# Patient Record
Sex: MALE | Race: WHITE
[De-identification: names, ages, dates, MRNs, and addresses within clinical notes are randomized per-mention and may not be internally consistent; named-entity substitution may affect disease eponyms.]

---

## 2019-12-12 ENCOUNTER — HOSPITAL ENCOUNTER (EMERGENCY)
Dept: HOSPITAL 74 - JER | Age: 44
LOS: 1 days | Discharge: HOME | End: 2019-12-13
Payer: COMMERCIAL

## 2019-12-12 VITALS — DIASTOLIC BLOOD PRESSURE: 82 MMHG | TEMPERATURE: 97.8 F | SYSTOLIC BLOOD PRESSURE: 140 MMHG | HEART RATE: 85 BPM

## 2019-12-12 VITALS — BODY MASS INDEX: 32.1 KG/M2

## 2019-12-12 DIAGNOSIS — R31.9: Primary | ICD-10-CM

## 2019-12-12 LAB
ALBUMIN SERPL-MCNC: 4.2 G/DL (ref 3.4–5)
ALP SERPL-CCNC: 108 U/L (ref 45–117)
ALT SERPL-CCNC: 63 U/L (ref 13–61)
ANION GAP SERPL CALC-SCNC: 9 MMOL/L (ref 8–16)
APPEARANCE UR: CLEAR
AST SERPL-CCNC: 22 U/L (ref 15–37)
BACTERIA # UR AUTO: 1.2 /HPF
BASOPHILS # BLD: 0.7 % (ref 0–2)
BILIRUB SERPL-MCNC: 1 MG/DL (ref 0.2–1)
BILIRUB UR STRIP.AUTO-MCNC: NEGATIVE MG/DL
BUN SERPL-MCNC: 17.3 MG/DL (ref 7–18)
CALCIUM SERPL-MCNC: 8.9 MG/DL (ref 8.5–10.1)
CASTS URNS QL MICRO: 0 /LPF (ref 0–8)
CHLORIDE SERPL-SCNC: 102 MMOL/L (ref 98–107)
CO2 SERPL-SCNC: 26 MMOL/L (ref 21–32)
COLOR UR: (no result)
CREAT SERPL-MCNC: 1 MG/DL (ref 0.55–1.3)
DEPRECATED RDW RBC AUTO: 12.6 % (ref 11.9–15.9)
EOSINOPHIL # BLD: 0.9 % (ref 0–4.5)
EPITH CASTS URNS QL MICRO: 0.4 /HPF
GLUCOSE SERPL-MCNC: 379 MG/DL (ref 74–106)
HCT VFR BLD CALC: 44.4 % (ref 35.4–49)
HGB BLD-MCNC: 15.9 GM/DL (ref 11.7–16.9)
KETONES UR QL STRIP: NEGATIVE
LEUKOCYTE ESTERASE UR QL STRIP.AUTO: NEGATIVE
LYMPHOCYTES # BLD: 15.3 % (ref 8–40)
MCH RBC QN AUTO: 30.9 PG (ref 25.7–33.7)
MCHC RBC AUTO-ENTMCNC: 35.7 G/DL (ref 32–35.9)
MCV RBC: 86.5 FL (ref 80–96)
MONOCYTES # BLD AUTO: 5.2 % (ref 3.8–10.2)
NEUTROPHILS # BLD: 77.9 % (ref 42.8–82.8)
NITRITE UR QL STRIP: NEGATIVE
PH UR: 6 [PH] (ref 5–8)
PLATELET # BLD AUTO: 201 K/MM3 (ref 134–434)
PMV BLD: 10.3 FL (ref 7.5–11.1)
POTASSIUM SERPLBLD-SCNC: 4.1 MMOL/L (ref 3.5–5.1)
PROT SERPL-MCNC: 7.9 G/DL (ref 6.4–8.2)
PROT UR QL STRIP: (no result)
PROT UR QL STRIP: (no result)
RBC # BLD AUTO: 5.13 M/MM3 (ref 4–5.6)
RBC # BLD AUTO: 589 /HPF (ref 0–4)
SODIUM SERPL-SCNC: 138 MMOL/L (ref 136–145)
SP GR UR: 1.04 (ref 1.01–1.03)
UROBILINOGEN UR STRIP-MCNC: 1 MG/DL (ref 0.2–1)
WBC # BLD AUTO: 8.9 K/MM3 (ref 4–10)
WBC # UR AUTO: 1 /HPF (ref 0–5)

## 2019-12-12 PROCEDURE — 3E0337Z INTRODUCTION OF ELECTROLYTIC AND WATER BALANCE SUBSTANCE INTO PERIPHERAL VEIN, PERCUTANEOUS APPROACH: ICD-10-PCS

## 2019-12-12 NOTE — PDOC
History of Present Illness





- General


Chief Complaint: Hematuria


Stated Complaint: PAIN TO URINATE/PASSING BLOOD


Time Seen by Provider: 12/12/19 20:15


History Source: Patient


Exam Limitations: No Limitations





- History of Present Illness


Initial Comments: 





12/12/19 21:24


44-year-old [place]ian-speaking male denies past medical history presents 

complaining of suprapubic pressure, urinary frequency and hematuria since 10 AM 

this morning.  Since 1 PM this afternoon he has been passing clots from his 

urethra.  Denies fever, chills, nausea, vomiting, back pain, penile discharge, 

penile pain, testicular pain.  Denies having similar symptoms in the past.





ROS:


GENERAL/CONSTITUTIONAL: No fever, chills, weakness, dizziness


HEAD, EYES, EARS, NOSE AND THROAT: No changes in vision, No ear pain or 

discharge, No sore throat


CARDIOVASCULAR: No chest pain


RESPIRATORY: No shortness of breath or cough


GASTROINTESTINAL: No pain, nausea, vomiting, diarrhea or constipation


GENITOURINARY: Positive hematuria, urinary frequency, no dysuria


MUSCULOSKELETAL:  No neck or back pain


SKIN: No rash


NEUROLOGIC: No headache, vertigo, loss of consciousness, or loss of sensation








PE:


GENERAL: well-appearing, NAD


HEAD: NCAT


EYES: Pupils equal, round and reactive to light, sclera anicteric, conjunctiva 

clear


ENT: pharynx: no erythema, no exudate, uvula midline


NECK: supple


CHEST: nontender


RESP: clear, no w/r/r


CARDIO: rrr, no m/g/r


ABD: +BS, soft, nontender, non distended


BACK: no midline spinal ttp, no CVAT 


EXTREMITIES: Normal range of motion, no edema


NEUROLOGICAL: Normal speech, normal gait


SKIN: Warm, Dry





Past History





- Past Medical History


Allergies/Adverse Reactions: 


 Allergies











Allergy/AdvReac Type Severity Reaction Status Date / Time


 


No Known Allergies Allergy   Verified 12/12/19 19:32











COPD: No





- Psycho Social/Smoking Cessation Hx


Smoking History: Never smoked





*Physical Exam





- Vital Signs


 Last Vital Signs











Temp Pulse Resp BP Pulse Ox


 


 97.8 F   85   16   140/82   16 L


 


 12/12/19 19:31  12/12/19 19:31  12/12/19 19:31  12/12/19 19:31  12/12/19 19:31














ED Treatment Course





- LABORATORY


CBC & Chemistry Diagram: 


 12/12/19 20:30





 12/12/19 20:30





- ADDITIONAL ORDERS


Additional order review: 


 Laboratory  Results











  12/12/19 12/12/19





  20:30 20:30


 


Sodium   138


 


Potassium   4.1


 


Chloride   102


 


Carbon Dioxide   26


 


Anion Gap   9


 


BUN   17.3


 


Creatinine   1.0


 


Est GFR (CKD-EPI)AfAm   106.36


 


Est GFR (CKD-EPI)NonAf   91.77


 


Random Glucose   379 H


 


Calcium   8.9


 


Total Bilirubin   1.0


 


AST   22


 


ALT   63 H


 


Alkaline Phosphatase   108


 


Total Protein   7.9


 


Albumin   4.2


 


Urine Color  Orange 


 


Urine Appearance  Clear 


 


Urine pH  6.0 


 


Ur Specific Gravity  1.037 H 


 


Urine Protein  1+ H 


 


Urine Glucose (UA)  3+ H 


 


Urine Ketones  Negative 


 


Urine Blood  3+ H 


 


Urine Nitrite  Negative 


 


Urine Bilirubin  Negative 


 


Urine Urobilinogen  1.0 


 


Ur Leukocyte Esterase  Negative 


 


Urine WBC (Auto)  1 


 


Urine RBC (Auto)  589 


 


Urine Casts (Auto)  0 


 


U Epithel Cells (Auto)  0.4 


 


Urine Bacteria (Auto)  1.2 








 











  12/12/19





  20:30


 


RBC  5.13


 


MCV  86.5


 


MCHC  35.7


 


RDW  12.6


 


MPV  10.3


 


Neutrophils %  77.9


 


Lymphocytes %  15.3


 


Monocytes %  5.2


 


Eosinophils %  0.9


 


Basophils %  0.7














- Medications


Given in the ED: 


ED Medications














Discontinued Medications














Generic Name Dose Route Start Last Admin





  Trade Name Freq  PRN Reason Stop Dose Admin


 


Sodium Chloride  1,000 ml  12/12/19 20:26  12/12/19 20:48





  Normal Saline -  IV  12/12/19 20:27  1,000 ml





  ONCE ONE   Administration





     





     





     





     














Medical Decision Making





- Medical Decision Making





12/12/19 21:29


44-year-old male denies past medical history presents complaining of urinary 

frequency, suprapubic pressure and hematuria today.  Denies fever, chills, back 

pain, nausea, vomiting, testicular pain or penile pain.





Will send CBC, CMP, UA and urine culture


Urine GC chlamydia


IV fluids


Reassess





12/12/19 23:05


Glucose 350s


UA positive for blood, no WBCs, no nitrites


Patient passing small amount of clots after 1 L of IV fluids


Signed out to Dr. Cotto in the main ED for further evaluation





Discharge





- Discharge Information


Problems reviewed: Yes


Clinical Impression/Diagnosis: 


Hematuria


Qualifiers:


 Hematuria type: unspecified type Qualified Code(s): R31.9 - Hematuria, 

unspecified





Condition: Stable





- Follow up/Referral





- Patient Discharge Instructions





- Post Discharge Activity

## 2019-12-13 NOTE — PDOC
*Physical Exam





- Vital Signs


 Last Vital Signs











Temp Pulse Resp BP Pulse Ox


 


 97.8 F   85   16   140/82   16 L


 


 12/12/19 19:31  12/12/19 19:31  12/12/19 19:31  12/12/19 19:31  12/12/19 19:31














ED Treatment Course





- LABORATORY


CBC & Chemistry Diagram: 


 12/12/19 20:30





 12/12/19 20:30





- ADDITIONAL ORDERS


Additional order review: 


 Laboratory  Results











  12/12/19 12/12/19





  20:30 20:30


 


Sodium   138


 


Potassium   4.1


 


Chloride   102


 


Carbon Dioxide   26


 


Anion Gap   9


 


BUN   17.3


 


Creatinine   1.0


 


Est GFR (CKD-EPI)AfAm   106.36


 


Est GFR (CKD-EPI)NonAf   91.77


 


Random Glucose   379 H


 


Calcium   8.9


 


Total Bilirubin   1.0


 


AST   22


 


ALT   63 H


 


Alkaline Phosphatase   108


 


Total Protein   7.9


 


Albumin   4.2


 


Urine Color  Orange 


 


Urine Appearance  Clear 


 


Urine pH  6.0 


 


Ur Specific Gravity  1.037 H 


 


Urine Protein  1+ H 


 


Urine Glucose (UA)  3+ H 


 


Urine Ketones  Negative 


 


Urine Blood  3+ H 


 


Urine Nitrite  Negative 


 


Urine Bilirubin  Negative 


 


Urine Urobilinogen  1.0 


 


Ur Leukocyte Esterase  Negative 


 


Urine WBC (Auto)  1 


 


Urine RBC (Auto)  589 


 


Urine Casts (Auto)  0 


 


U Epithel Cells (Auto)  0.4 


 


Urine Bacteria (Auto)  1.2 








 











  12/12/19





  20:30


 


RBC  5.13


 


MCV  86.5


 


MCHC  35.7


 


RDW  12.6


 


MPV  10.3


 


Neutrophils %  77.9


 


Lymphocytes %  15.3


 


Monocytes %  5.2


 


Eosinophils %  0.9


 


Basophils %  0.7














- Medications


Given in the ED: 


ED Medications














Discontinued Medications














Generic Name Dose Route Start Last Admin





  Trade Name Freq  PRN Reason Stop Dose Admin


 


Sodium Chloride  1,000 ml  12/12/19 20:26  12/12/19 20:48





  Normal Saline -  IV  12/12/19 20:27  1,000 ml





  ONCE ONE   Administration





     





     





     





     














Medical Decision Making





- Medical Decision Making





12/13/19 00:57


Patient signed out as up triage from fast track.  Patient was being worked up 

for painless hematuria a/w frequency, urgency and suprapubic tenseness. No n/v, 

recent illness, rash. Afebrile





Urine sample visual exam with gross hematuria and clots, UA with +RBC, +protein

, negative for any casts


Isolated asymptomatic hyperglycemia, not ill appearing, UA pos for glu but neg 

for ketones, BMP only remarkable for glu of 379, no anemia








Patient moved to bed in main ED for CBI





Dispo per clinical course








CBI initiated, output initially of pink but clear urine, no clots


After about 30 minutes of irrigation, output cleared completely.  No signs of 

clots or hematuria





DC broussard


Urology follow up w/in 2 weeks


PCP follow up for glycemic control








Discharge





- Discharge Information


Problems reviewed: Yes


Clinical Impression/Diagnosis: 


Hematuria


Qualifiers:


 Hematuria type: unspecified type Qualified Code(s): R31.9 - Hematuria, 

unspecified





Condition: Improved


Disposition: HOME





- Admission


No





- Follow up/Referral


Referrals: 


Josue Colón MD [Staff Physician] - 


Ray Green MD [Staff Physician] - 


John Winkler MD., MD [Staff Physician] - 


Gabriel Judge MD [Staff Physician] - 


Khari Deleon MD [Staff Physician] - 





- Patient Discharge Instructions


Patient Printed Discharge Instructions:  DI for Hyperglycemia -- Adult, DI for 

Hematuria


Additional Instructions: 


You were seen here today for blood in your urine.  Initially there were visible 

clots but your urine cleared completely after irrigation.  Please make sure to 

follow up with one of the listed urologists within the next 2 weeks for a 

repeat evaluation of your urine and possible further evaluation.  If, before 

your follow up appointment, you note blood in your urine or become unable to 

pass urine please be re-evaluated emergently.





You were also found to have a high blood sugar.  Please make sure to follow up 

with your PCP to have your sugar monitored and managed.


Print Language: Kiswahili





- Post Discharge Activity

## 2022-12-21 ENCOUNTER — HOSPITAL ENCOUNTER (EMERGENCY)
Dept: HOSPITAL 74 - JER | Age: 47
LOS: 1 days | Discharge: HOME | End: 2022-12-22
Payer: COMMERCIAL

## 2022-12-21 VITALS — BODY MASS INDEX: 31.1 KG/M2

## 2022-12-21 VITALS
HEART RATE: 91 BPM | SYSTOLIC BLOOD PRESSURE: 141 MMHG | TEMPERATURE: 98.3 F | DIASTOLIC BLOOD PRESSURE: 90 MMHG | RESPIRATION RATE: 19 BRPM

## 2022-12-21 DIAGNOSIS — R10.12: Primary | ICD-10-CM

## 2022-12-21 PROCEDURE — 3E033GC INTRODUCTION OF OTHER THERAPEUTIC SUBSTANCE INTO PERIPHERAL VEIN, PERCUTANEOUS APPROACH: ICD-10-PCS

## 2022-12-22 LAB
ALBUMIN SERPL-MCNC: 4 G/DL (ref 3.4–5)
ALP SERPL-CCNC: 92 U/L (ref 45–117)
ALT SERPL-CCNC: 73 U/L (ref 13–61)
ANION GAP SERPL CALC-SCNC: 8 MMOL/L (ref 8–16)
APPEARANCE UR: CLEAR
AST SERPL-CCNC: 31 U/L (ref 15–37)
BASOPHILS # BLD: 0.7 % (ref 0–2)
BILIRUB SERPL-MCNC: 0.8 MG/DL (ref 0.2–1)
BILIRUB UR STRIP.AUTO-MCNC: NEGATIVE MG/DL
BUN SERPL-MCNC: 25 MG/DL (ref 7–18)
CALCIUM SERPL-MCNC: 9.1 MG/DL (ref 8.5–10.1)
CHLORIDE SERPL-SCNC: 102 MMOL/L (ref 98–107)
CO2 SERPL-SCNC: 28 MMOL/L (ref 21–32)
COLOR UR: YELLOW
CREAT SERPL-MCNC: 0.9 MG/DL (ref 0.55–1.3)
DEPRECATED RDW RBC AUTO: 12.7 % (ref 11.9–15.9)
EOSINOPHIL # BLD: 3.3 % (ref 0–4.5)
GLUCOSE SERPL-MCNC: 296 MG/DL (ref 74–106)
HCT VFR BLD CALC: 42 % (ref 35.4–49)
HGB BLD-MCNC: 14.3 GM/DL (ref 11.7–16.9)
KETONES UR QL STRIP: NEGATIVE
LEUKOCYTE ESTERASE UR QL STRIP.AUTO: NEGATIVE
LIPASE SERPL-CCNC: 240 U/L (ref 73–393)
LYMPHOCYTES # BLD: 30.9 % (ref 8–40)
MCH RBC QN AUTO: 29.6 PG (ref 25.7–33.7)
MCHC RBC AUTO-ENTMCNC: 34.1 G/DL (ref 32–35.9)
MCV RBC: 86.8 FL (ref 80–96)
MONOCYTES # BLD AUTO: 6.4 % (ref 3.8–10.2)
NEUTROPHILS # BLD: 58.7 % (ref 42.8–82.8)
NITRITE UR QL STRIP: NEGATIVE
PH UR: 5.5 [PH] (ref 5–8)
PLATELET # BLD AUTO: 200 10^3/UL (ref 134–434)
PMV BLD: 9.5 FL (ref 7.5–11.1)
PROT SERPL-MCNC: 7.5 G/DL (ref 6.4–8.2)
PROT UR QL STRIP: (no result)
PROT UR QL STRIP: NEGATIVE
RBC # BLD AUTO: 4.84 M/MM3 (ref 4–5.6)
SODIUM SERPL-SCNC: 138 MMOL/L (ref 136–145)
SP GR UR: 1.08 (ref 1.01–1.03)
UROBILINOGEN UR STRIP-MCNC: 0.2 MG/DL (ref 0.2–1)
WBC # BLD AUTO: 6.4 K/MM3 (ref 4–10)